# Patient Record
Sex: FEMALE | Race: OTHER | HISPANIC OR LATINO | Employment: OTHER | URBAN - METROPOLITAN AREA
[De-identification: names, ages, dates, MRNs, and addresses within clinical notes are randomized per-mention and may not be internally consistent; named-entity substitution may affect disease eponyms.]

---

## 2019-08-01 ENCOUNTER — OFFICE VISIT (OUTPATIENT)
Dept: FAMILY MEDICINE CLINIC | Facility: CLINIC | Age: 74
End: 2019-08-01

## 2019-08-01 VITALS
HEIGHT: 59 IN | BODY MASS INDEX: 23.93 KG/M2 | TEMPERATURE: 97.3 F | SYSTOLIC BLOOD PRESSURE: 218 MMHG | WEIGHT: 118.7 LBS | DIASTOLIC BLOOD PRESSURE: 100 MMHG | OXYGEN SATURATION: 98 % | HEART RATE: 89 BPM | RESPIRATION RATE: 16 BRPM

## 2019-08-01 DIAGNOSIS — Z11.59 ENCOUNTER FOR HEPATITIS C SCREENING TEST FOR LOW RISK PATIENT: ICD-10-CM

## 2019-08-01 DIAGNOSIS — Z12.11 COLON CANCER SCREENING: ICD-10-CM

## 2019-08-01 DIAGNOSIS — I16.0 HYPERTENSIVE URGENCY: ICD-10-CM

## 2019-08-01 DIAGNOSIS — Z13.820 OSTEOPOROSIS SCREENING: ICD-10-CM

## 2019-08-01 DIAGNOSIS — Z23 NEED FOR VACCINATION: ICD-10-CM

## 2019-08-01 DIAGNOSIS — Z12.39 BREAST CANCER SCREENING: ICD-10-CM

## 2019-08-01 DIAGNOSIS — I10 HYPERTENSION, UNSPECIFIED TYPE: Primary | ICD-10-CM

## 2019-08-01 DIAGNOSIS — I67.1 BRAIN ANEURYSM: ICD-10-CM

## 2019-08-01 DIAGNOSIS — E78.5 HYPERLIPIDEMIA, UNSPECIFIED HYPERLIPIDEMIA TYPE: ICD-10-CM

## 2019-08-01 DIAGNOSIS — I65.29 STENOSIS OF CAROTID ARTERY, UNSPECIFIED LATERALITY: ICD-10-CM

## 2019-08-01 DIAGNOSIS — Z78.0 MENOPAUSE: ICD-10-CM

## 2019-08-01 PROBLEM — E78.49 OTHER HYPERLIPIDEMIA: Status: ACTIVE | Noted: 2019-08-01

## 2019-08-01 PROCEDURE — 93000 ELECTROCARDIOGRAM COMPLETE: CPT | Performed by: PHYSICIAN ASSISTANT

## 2019-08-01 PROCEDURE — 90471 IMMUNIZATION ADMIN: CPT

## 2019-08-01 PROCEDURE — 90670 PCV13 VACCINE IM: CPT

## 2019-08-01 PROCEDURE — 99204 OFFICE O/P NEW MOD 45 MIN: CPT | Performed by: PHYSICIAN ASSISTANT

## 2019-08-01 PROCEDURE — 4040F PNEUMOC VAC/ADMIN/RCVD: CPT

## 2019-08-01 RX ORDER — VERAPAMIL HYDROCHLORIDE 240 MG/1
240 TABLET, FILM COATED, EXTENDED RELEASE ORAL
Qty: 30 TABLET | Refills: 2 | Status: SHIPPED | OUTPATIENT
Start: 2019-08-01 | End: 2019-11-11 | Stop reason: SDUPTHER

## 2019-08-01 RX ORDER — VERAPAMIL HYDROCHLORIDE 240 MG/1
240 TABLET, FILM COATED, EXTENDED RELEASE ORAL
COMMUNITY
End: 2019-08-01 | Stop reason: SDUPTHER

## 2019-08-01 RX ORDER — SIMVASTATIN 20 MG
20 TABLET ORAL
COMMUNITY
End: 2019-08-01 | Stop reason: SDUPTHER

## 2019-08-01 RX ORDER — SIMVASTATIN 20 MG
20 TABLET ORAL
Qty: 30 TABLET | Refills: 2 | Status: SHIPPED | OUTPATIENT
Start: 2019-08-01 | End: 2019-11-11 | Stop reason: SDUPTHER

## 2019-08-01 RX ORDER — ENALAPRIL MALEATE 20 MG/1
20 TABLET ORAL DAILY
Qty: 30 TABLET | Refills: 2 | Status: SHIPPED | OUTPATIENT
Start: 2019-08-01 | End: 2019-11-11 | Stop reason: SDUPTHER

## 2019-08-01 RX ORDER — ENALAPRIL MALEATE 20 MG/1
20 TABLET ORAL DAILY
COMMUNITY
End: 2019-08-01 | Stop reason: SDUPTHER

## 2019-08-01 RX ORDER — CLONIDINE HYDROCHLORIDE 0.1 MG/1
0.1 TABLET ORAL ONCE
Status: COMPLETED | OUTPATIENT
Start: 2019-08-01 | End: 2019-08-01

## 2019-08-01 RX ADMIN — CLONIDINE HYDROCHLORIDE 0.1 MG: 0.1 TABLET ORAL at 14:54

## 2019-08-01 NOTE — PROGRESS NOTES
Assessment/Plan:    Hypertensive urgency  EKG showed normal sinus rhythm today  She currently has mild headache which she states it goes does go away with Tylenol and has been chronic for years     Blood pressure did lower with clonidine today and she will start taking verapamil and enalapril that she was on  To do labs ordered and will return in 1 week for recheck  Discussed that if she develops worsening headache, vision changes, dizziness, chest pain then she should go to the emergency room immediately         Problem List Items Addressed This Visit        Cardiovascular and Mediastinum    Hypertension - Primary    Relevant Medications    cloNIDine (CATAPRES) tablet 0 1 mg (Completed)    enalapril (VASOTEC) 20 mg tablet    verapamil (CALAN-SR) 240 mg CR tablet    Other Relevant Orders    CBC and differential    Comprehensive metabolic panel    Lipid panel    Microalbumin / creatinine urine ratio    POCT ECG (Completed)    Hypertensive urgency     EKG showed normal sinus rhythm today  She currently has mild headache which she states it goes does go away with Tylenol and has been chronic for years     Blood pressure did lower with clonidine today and she will start taking verapamil and enalapril that she was on  To do labs ordered and will return in 1 week for recheck    Discussed that if she develops worsening headache, vision changes, dizziness, chest pain then she should go to the emergency room immediately         Relevant Medications    cloNIDine (CATAPRES) tablet 0 1 mg (Completed)    enalapril (VASOTEC) 20 mg tablet    verapamil (CALAN-SR) 240 mg CR tablet      Other Visit Diagnoses     Need for vaccination        Relevant Orders    PNEUMOCOCCAL CONJUGATE VACCINE 13-VALENT GREATER THAN 6 MONTHS (Completed)    Breast cancer screening        Relevant Orders    Mammo screening bilateral w cad    Osteoporosis screening        Relevant Orders    DXA bone density spine hip and pelvis    Colon cancer screening Relevant Orders    Ambulatory referral to Gastroenterology    Menopause        Relevant Orders    DXA bone density spine hip and pelvis    Encounter for hepatitis C screening test for low risk patient        Relevant Orders    Hepatitis C antibody    Hyperlipidemia, unspecified hyperlipidemia type        Relevant Medications    simvastatin (ZOCOR) 20 mg tablet            Subjective:      Patient ID: Clare Worthington is a 69 y o  female  [facility] [place] Aminah Thomas [id number] [address]     68 y/o F here for NP visit  She has a history of hypertension and aneursym  She just arrived here from [facility]  When there she had surgery years ago for an aneursym  She had tube from her leg up the neck  The vein was clogged in her neck up to her brain  She was put on medication to help with blood flow and she believes it was plavix  She also is supposed to be on amlodipine and verapamil for hypertension  These were controlling her blood pressure but she has not taken for about 3 months  She denies any chest pain, shortness of breath leg swelling  She does have mild headache but has been getting these headaches for years  The headache resolves with Tylenol  She also feels the headaches may be associated with vision problem in her left eye  She has a problem with left eye  She was told she needs surgery again  She does not know what was wrong with her eye other than there is cloudiness  She has not had colonoscopy, mammogram or DEXA scan in the past     PHQ-9 Depression Screening    PHQ-9:    Frequency of the following problems over the past two weeks: The following portions of the patient's history were reviewed and updated as appropriate:   She  has a past medical history of Aneurysm (arteriovenous) of coronary vessels (2005) and Hypertension    She   Patient Active Problem List    Diagnosis Date Noted    Hypertension 08/01/2019    Other hyperlipidemia 08/01/2019    Hypertensive urgency 2019    Aneurysm (arteriovenous) of coronary vessels 2005     She  has a past surgical history that includes Hysterectomy and  section  Her family history includes Alcohol abuse in her brother; Arthritis in her sister; Cancer in her brother; Chronic fatigue in her mother; Hypertension in her sister; No Known Problems in her brother and father  She  reports that she has never smoked  She has never used smokeless tobacco  She reports that she does not drink alcohol or use drugs  Current Outpatient Medications   Medication Sig Dispense Refill    enalapril (VASOTEC) 20 mg tablet Take 1 tablet (20 mg total) by mouth daily 30 tablet 2    simvastatin (ZOCOR) 20 mg tablet Take 1 tablet (20 mg total) by mouth daily at bedtime 30 tablet 2    verapamil (CALAN-SR) 240 mg CR tablet Take 1 tablet (240 mg total) by mouth daily at bedtime 30 tablet 2     No current facility-administered medications for this visit  Current Outpatient Medications on File Prior to Visit   Medication Sig    [DISCONTINUED] enalapril (VASOTEC) 20 mg tablet Take 20 mg by mouth daily    [DISCONTINUED] simvastatin (ZOCOR) 20 mg tablet Take 20 mg by mouth daily at bedtime    [DISCONTINUED] verapamil (CALAN-SR) 240 mg CR tablet Take 240 mg by mouth daily at bedtime     No current facility-administered medications on file prior to visit  She is allergic to aspirin       Review of Systems   Constitutional: Negative for fatigue  Eyes: Positive for visual disturbance (chronic in left eye only)  Respiratory: Negative for shortness of breath  Cardiovascular: Negative for chest pain and leg swelling  Gastrointestinal: Negative for abdominal pain  Genitourinary: Negative for difficulty urinating  Musculoskeletal: Positive for arthralgias (sometimes she gets bone pain)  Neurological: Positive for headaches (mild headache now)  Negative for dizziness           Objective:      BP (!) 218/100 (BP Location: Left arm, Patient Position: Sitting, Cuff Size: Adult)   Pulse 89   Temp (!) 97 3 °F (36 3 °C) (Tympanic)   Resp 16   Ht 4' 11" (1 499 m)   Wt 53 8 kg (118 lb 11 2 oz)   SpO2 98%   BMI 23 97 kg/m²          Physical Exam   Constitutional: She is oriented to person, place, and time  She appears well-developed and well-nourished  HENT:   Head: Normocephalic and atraumatic  Right Ear: External ear normal    Left Ear: External ear normal    Nose: Nose normal    Mouth/Throat: Oropharynx is clear and moist    Eyes: Conjunctivae are normal    Clouding of left pupil   Neck: Normal range of motion  Neck supple  No thyromegaly present  Cardiovascular: Normal rate, regular rhythm and normal heart sounds  No murmur heard  Pulmonary/Chest: Effort normal and breath sounds normal  No respiratory distress  Abdominal: Soft  Bowel sounds are normal  She exhibits no mass  There is no tenderness  There is no guarding  Musculoskeletal: Normal range of motion  Lymphadenopathy:     She has no cervical adenopathy  Neurological: She is alert and oriented to person, place, and time  Skin: Skin is warm  Psychiatric: She has a normal mood and affect  Her behavior is normal    Nursing note and vitals reviewed        EKG NSR

## 2019-08-01 NOTE — ASSESSMENT & PLAN NOTE
EKG showed normal sinus rhythm today  She currently has mild headache which she states it goes does go away with Tylenol and has been chronic for years     Blood pressure did lower with clonidine today and she will start taking verapamil and enalapril that she was on  To do labs ordered and will return in 1 week for recheck    Discussed that if she develops worsening headache, vision changes, dizziness, chest pain then she should go to the emergency room immediately

## 2019-08-07 ENCOUNTER — TELEPHONE (OUTPATIENT)
Dept: FAMILY MEDICINE CLINIC | Facility: CLINIC | Age: 74
End: 2019-08-07

## 2019-08-08 ENCOUNTER — CLINICAL SUPPORT (OUTPATIENT)
Dept: FAMILY MEDICINE CLINIC | Facility: CLINIC | Age: 74
End: 2019-08-08

## 2019-08-08 VITALS — OXYGEN SATURATION: 97 % | DIASTOLIC BLOOD PRESSURE: 92 MMHG | HEART RATE: 72 BPM | SYSTOLIC BLOOD PRESSURE: 172 MMHG

## 2019-08-08 DIAGNOSIS — I16.0 HYPERTENSIVE URGENCY: ICD-10-CM

## 2019-08-08 DIAGNOSIS — I10 HYPERTENSION, UNSPECIFIED TYPE: Primary | ICD-10-CM

## 2019-08-08 DIAGNOSIS — Z01.30 BLOOD PRESSURE CHECK: Primary | ICD-10-CM

## 2019-08-08 RX ORDER — ATENOLOL 25 MG/1
25 TABLET ORAL DAILY
Qty: 30 TABLET | Refills: 1 | Status: SHIPPED | OUTPATIENT
Start: 2019-08-08 | End: 2019-10-16 | Stop reason: SDUPTHER

## 2019-08-08 NOTE — PROGRESS NOTES
Pt confirms taking medication everyday as instructed  Pt has a bit of a headache today but denies any other symptoms  Consulted with Ruthie and advised pt, per Ruthie, atenolol was added to see if it helps along with the other for bp  She should take it with her enalapril and verapamil  Recheck for bp in one week  Pt expressed understanding and agreed

## 2019-08-08 NOTE — PATIENT INSTRUCTIONS
atenolol from the pharmacy  Schedule nurse visit for one week to check blood pressure    Go to the emergency department if your headaches are different than the ones you are used to getting or if they get stronger than usual

## 2019-08-23 ENCOUNTER — TELEPHONE (OUTPATIENT)
Dept: FAMILY MEDICINE CLINIC | Facility: CLINIC | Age: 74
End: 2019-08-23

## 2019-08-23 NOTE — TELEPHONE ENCOUNTER
Left message for patient in reference to start the process of getting her medical records from [address] [place] [address]

## 2019-08-23 NOTE — TELEPHONE ENCOUNTER
MRI needs records of her aneurysm repair, sounds like it was done in [place]? ? They need this report before they can proceed with scheduling her MRI  There is nothing on her chart, could you please contact pt and start the process for medical records request from where she had this repair done?

## 2019-10-16 DIAGNOSIS — I16.0 HYPERTENSIVE URGENCY: ICD-10-CM

## 2019-10-16 RX ORDER — ATENOLOL 25 MG/1
25 TABLET ORAL DAILY
Qty: 30 TABLET | Refills: 1 | Status: SHIPPED | OUTPATIENT
Start: 2019-10-16

## 2019-11-11 DIAGNOSIS — I16.0 HYPERTENSIVE URGENCY: ICD-10-CM

## 2019-11-11 DIAGNOSIS — E78.5 HYPERLIPIDEMIA, UNSPECIFIED HYPERLIPIDEMIA TYPE: ICD-10-CM

## 2019-11-11 RX ORDER — ENALAPRIL MALEATE 20 MG/1
20 TABLET ORAL DAILY
Qty: 30 TABLET | Refills: 2 | Status: SHIPPED | OUTPATIENT
Start: 2019-11-11 | End: 2020-03-02 | Stop reason: SDUPTHER

## 2019-11-11 RX ORDER — SIMVASTATIN 20 MG
20 TABLET ORAL
Qty: 30 TABLET | Refills: 2 | Status: SHIPPED | OUTPATIENT
Start: 2019-11-11 | End: 2020-03-02 | Stop reason: SDUPTHER

## 2019-11-11 RX ORDER — VERAPAMIL HYDROCHLORIDE 240 MG/1
240 TABLET, FILM COATED, EXTENDED RELEASE ORAL
Qty: 30 TABLET | Refills: 2 | Status: SHIPPED | OUTPATIENT
Start: 2019-11-11 | End: 2020-03-02 | Stop reason: SDUPTHER

## 2020-03-02 DIAGNOSIS — I16.0 HYPERTENSIVE URGENCY: ICD-10-CM

## 2020-03-02 DIAGNOSIS — E78.5 HYPERLIPIDEMIA, UNSPECIFIED HYPERLIPIDEMIA TYPE: ICD-10-CM

## 2020-03-02 NOTE — TELEPHONE ENCOUNTER
I will refill her meds but she needs an appointment  She also didn't do any of the tests she was supposed to  I can put in new orders for colonoscopy, mammo and dexa    Can you see if she is willing to go for them and also set her up for follow up

## 2020-03-04 ENCOUNTER — TELEPHONE (OUTPATIENT)
Dept: FAMILY MEDICINE CLINIC | Facility: CLINIC | Age: 75
End: 2020-03-04

## 2020-03-04 NOTE — TELEPHONE ENCOUNTER
[facility] pharmacy leave a msg in the refill line requesting refill on verapamil, simvastatin,Enalapril 90 days supply

## 2020-03-05 RX ORDER — ENALAPRIL MALEATE 20 MG/1
20 TABLET ORAL DAILY
Qty: 30 TABLET | Refills: 1 | Status: SHIPPED | OUTPATIENT
Start: 2020-03-05 | End: 2020-04-21 | Stop reason: SDUPTHER

## 2020-03-05 RX ORDER — VERAPAMIL HYDROCHLORIDE 240 MG/1
240 TABLET, FILM COATED, EXTENDED RELEASE ORAL
Qty: 30 TABLET | Refills: 1 | Status: SHIPPED | OUTPATIENT
Start: 2020-03-05 | End: 2020-04-21 | Stop reason: SDUPTHER

## 2020-03-05 RX ORDER — SIMVASTATIN 20 MG
20 TABLET ORAL
Qty: 30 TABLET | Refills: 1 | Status: SHIPPED | OUTPATIENT
Start: 2020-03-05 | End: 2020-04-21 | Stop reason: SDUPTHER

## 2020-03-17 ENCOUNTER — TELEPHONE (OUTPATIENT)
Dept: FAMILY MEDICINE CLINIC | Facility: CLINIC | Age: 75
End: 2020-03-17

## 2020-04-17 ENCOUNTER — TELEPHONE (OUTPATIENT)
Dept: FAMILY MEDICINE CLINIC | Facility: CLINIC | Age: 75
End: 2020-04-17

## 2020-04-21 DIAGNOSIS — I16.0 HYPERTENSIVE URGENCY: ICD-10-CM

## 2020-04-21 DIAGNOSIS — E78.5 HYPERLIPIDEMIA, UNSPECIFIED HYPERLIPIDEMIA TYPE: ICD-10-CM

## 2020-04-21 RX ORDER — ENALAPRIL MALEATE 20 MG/1
20 TABLET ORAL DAILY
Qty: 90 TABLET | Refills: 0 | Status: SHIPPED | OUTPATIENT
Start: 2020-04-21

## 2020-04-21 RX ORDER — SIMVASTATIN 20 MG
20 TABLET ORAL
Qty: 90 TABLET | Refills: 0 | Status: SHIPPED | OUTPATIENT
Start: 2020-04-21

## 2020-04-21 RX ORDER — VERAPAMIL HYDROCHLORIDE 240 MG/1
240 TABLET, FILM COATED, EXTENDED RELEASE ORAL
Qty: 90 TABLET | Refills: 0 | Status: SHIPPED | OUTPATIENT
Start: 2020-04-21

## 2020-05-12 DIAGNOSIS — I16.0 HYPERTENSIVE URGENCY: ICD-10-CM

## 2020-05-12 DIAGNOSIS — E78.5 HYPERLIPIDEMIA, UNSPECIFIED HYPERLIPIDEMIA TYPE: ICD-10-CM

## 2020-05-12 RX ORDER — ENALAPRIL MALEATE 20 MG/1
TABLET ORAL
Qty: 90 TABLET | Refills: 0 | OUTPATIENT
Start: 2020-05-12

## 2020-05-12 RX ORDER — VERAPAMIL HYDROCHLORIDE 240 MG/1
TABLET, FILM COATED, EXTENDED RELEASE ORAL
Qty: 90 TABLET | Refills: 0 | OUTPATIENT
Start: 2020-05-12

## 2020-05-12 RX ORDER — SIMVASTATIN 20 MG
TABLET ORAL
Qty: 90 TABLET | Refills: 0 | OUTPATIENT
Start: 2020-05-12

## 2020-11-05 DIAGNOSIS — I16.0 HYPERTENSIVE URGENCY: ICD-10-CM

## 2020-11-05 DIAGNOSIS — E78.5 HYPERLIPIDEMIA, UNSPECIFIED HYPERLIPIDEMIA TYPE: ICD-10-CM

## 2020-11-05 RX ORDER — VERAPAMIL HYDROCHLORIDE 240 MG/1
240 TABLET, FILM COATED, EXTENDED RELEASE ORAL
Qty: 90 TABLET | Refills: 0 | OUTPATIENT
Start: 2020-11-05

## 2020-11-05 RX ORDER — SIMVASTATIN 20 MG
20 TABLET ORAL
Qty: 90 TABLET | Refills: 0 | OUTPATIENT
Start: 2020-11-05

## 2020-11-05 RX ORDER — ENALAPRIL MALEATE 20 MG/1
20 TABLET ORAL DAILY
Qty: 90 TABLET | Refills: 0 | OUTPATIENT
Start: 2020-11-05

## 2021-05-21 ENCOUNTER — TELEPHONE (OUTPATIENT)
Dept: FAMILY MEDICINE CLINIC | Facility: CLINIC | Age: 76
End: 2021-05-21
